# Patient Record
Sex: FEMALE | Race: BLACK OR AFRICAN AMERICAN | NOT HISPANIC OR LATINO
[De-identification: names, ages, dates, MRNs, and addresses within clinical notes are randomized per-mention and may not be internally consistent; named-entity substitution may affect disease eponyms.]

---

## 2017-11-22 ENCOUNTER — APPOINTMENT (OUTPATIENT)
Dept: PULMONOLOGY | Facility: CLINIC | Age: 37
End: 2017-11-22
Payer: COMMERCIAL

## 2017-11-22 VITALS
BODY MASS INDEX: 45.99 KG/M2 | TEMPERATURE: 98 F | RESPIRATION RATE: 16 BRPM | SYSTOLIC BLOOD PRESSURE: 130 MMHG | HEART RATE: 83 BPM | OXYGEN SATURATION: 98 % | WEIGHT: 293 LBS | HEIGHT: 67 IN | DIASTOLIC BLOOD PRESSURE: 82 MMHG

## 2017-11-22 DIAGNOSIS — Z86.39 PERSONAL HISTORY OF OTHER ENDOCRINE, NUTRITIONAL AND METABOLIC DISEASE: ICD-10-CM

## 2017-11-22 DIAGNOSIS — Z86.79 PERSONAL HISTORY OF OTHER DISEASES OF THE CIRCULATORY SYSTEM: ICD-10-CM

## 2017-11-22 PROCEDURE — 94060 EVALUATION OF WHEEZING: CPT

## 2017-11-22 PROCEDURE — 94729 DIFFUSING CAPACITY: CPT

## 2017-11-22 PROCEDURE — 99204 OFFICE O/P NEW MOD 45 MIN: CPT | Mod: 25

## 2017-11-22 PROCEDURE — 94727 GAS DIL/WSHOT DETER LNG VOL: CPT

## 2017-11-22 RX ORDER — METFORMIN HYDROCHLORIDE 500 MG/1
500 TABLET, COATED ORAL
Refills: 0 | Status: ACTIVE | COMMUNITY

## 2017-11-22 RX ORDER — AMLODIPINE BESYLATE 5 MG/1
TABLET ORAL
Refills: 0 | Status: ACTIVE | COMMUNITY

## 2018-01-24 ENCOUNTER — APPOINTMENT (OUTPATIENT)
Dept: PULMONOLOGY | Facility: CLINIC | Age: 38
End: 2018-01-24
Payer: COMMERCIAL

## 2018-01-24 VITALS
TEMPERATURE: 98.7 F | BODY MASS INDEX: 45.99 KG/M2 | OXYGEN SATURATION: 100 % | WEIGHT: 293 LBS | HEIGHT: 67 IN | HEART RATE: 76 BPM | SYSTOLIC BLOOD PRESSURE: 124 MMHG | RESPIRATION RATE: 16 BRPM | DIASTOLIC BLOOD PRESSURE: 84 MMHG

## 2018-01-24 DIAGNOSIS — G47.33 OBSTRUCTIVE SLEEP APNEA (ADULT) (PEDIATRIC): ICD-10-CM

## 2018-01-24 PROCEDURE — 99214 OFFICE O/P EST MOD 30 MIN: CPT

## 2018-03-21 ENCOUNTER — APPOINTMENT (OUTPATIENT)
Dept: PULMONOLOGY | Facility: CLINIC | Age: 38
End: 2018-03-21
Payer: COMMERCIAL

## 2018-03-21 VITALS
HEIGHT: 67 IN | OXYGEN SATURATION: 97 % | HEART RATE: 75 BPM | WEIGHT: 293 LBS | DIASTOLIC BLOOD PRESSURE: 94 MMHG | SYSTOLIC BLOOD PRESSURE: 148 MMHG | TEMPERATURE: 98.6 F | RESPIRATION RATE: 16 BRPM | BODY MASS INDEX: 45.99 KG/M2

## 2018-03-21 PROCEDURE — 94060 EVALUATION OF WHEEZING: CPT

## 2018-03-21 PROCEDURE — 99215 OFFICE O/P EST HI 40 MIN: CPT | Mod: 25

## 2018-03-21 PROCEDURE — 94727 GAS DIL/WSHOT DETER LNG VOL: CPT

## 2018-03-21 PROCEDURE — 94729 DIFFUSING CAPACITY: CPT

## 2018-03-21 RX ORDER — LEVOTHYROXINE SODIUM 0.17 MG/1
TABLET ORAL
Refills: 0 | Status: ACTIVE | COMMUNITY

## 2020-04-18 ENCOUNTER — TRANSCRIPTION ENCOUNTER (OUTPATIENT)
Age: 40
End: 2020-04-18

## 2021-01-12 ENCOUNTER — TRANSCRIPTION ENCOUNTER (OUTPATIENT)
Age: 41
End: 2021-01-12

## 2021-01-20 ENCOUNTER — APPOINTMENT (OUTPATIENT)
Dept: PULMONOLOGY | Facility: CLINIC | Age: 41
End: 2021-01-20
Payer: COMMERCIAL

## 2021-01-20 VITALS
SYSTOLIC BLOOD PRESSURE: 150 MMHG | WEIGHT: 293 LBS | DIASTOLIC BLOOD PRESSURE: 94 MMHG | HEART RATE: 74 BPM | BODY MASS INDEX: 47.93 KG/M2 | TEMPERATURE: 98.2 F | OXYGEN SATURATION: 99 %

## 2021-01-20 DIAGNOSIS — J45.909 UNSPECIFIED ASTHMA, UNCOMPLICATED: ICD-10-CM

## 2021-01-20 DIAGNOSIS — R06.02 SHORTNESS OF BREATH: ICD-10-CM

## 2021-01-20 PROCEDURE — 94060 EVALUATION OF WHEEZING: CPT

## 2021-01-20 PROCEDURE — 99214 OFFICE O/P EST MOD 30 MIN: CPT | Mod: 25

## 2021-01-20 PROCEDURE — 94729 DIFFUSING CAPACITY: CPT

## 2021-01-20 PROCEDURE — 99072 ADDL SUPL MATRL&STAF TM PHE: CPT

## 2021-01-20 PROCEDURE — 94727 GAS DIL/WSHOT DETER LNG VOL: CPT

## 2021-01-20 NOTE — REASON FOR VISIT
[Follow-Up - From Urgent Care] : an urgent care follow-up [Shortness of Breath] : shortness of Breath

## 2021-01-20 NOTE — PROCEDURE
[FreeTextEntry1] : PSG 1/5/18: Severe obstructive sleep apnea, with an AHI 89. CPAP titration indicated a therapeutic level of 7 cm of water. \par \par PFT 3/21/18: Spirometry was normal. Lung volumes were mildly reduced. DLCO was mildly reduced. No significant improvement after bronchodilator. \par \par PFT 1/20/21: No obstruction. Mild restriction. Mild reduction in diffusion. Mild improvement after bronchodilator. \par \par Chest x-ray 1/7/21: No infiltrates or effusion.

## 2021-01-20 NOTE — DISCUSSION/SUMMARY
[FreeTextEntry1] : She is a 40 year-old woman with a history of hypertension, hypothyroidism, diabetes, elevated BMI and asthma since childhood. She also has a history of severe MEHRAN. Apparently stopped CPAP. \par \par Presented with FUCHS. Spirometry was normal. Advised to use albuterol as needed. Should see Cardiology. Weight loss and exercise were advised if allowed by Cardiology. \par \par Should consider resuming CPAP. \par \par Follow up depending on Cardiology evaluation.

## 2021-01-20 NOTE — HISTORY OF PRESENT ILLNESS
[TextBox_4] : 39 y/o woman seen in ER for SOB. Was treated for asthma. Given albuterol, Symbicort and a Medrol pack. \par \par Feels better but c/o FUCHS. 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 Has to wear an N95 mask at work which makes her SOB. \par \par She had COVID -19 in April of 2020. was not hospitalized. \par \par Had bariatric surgery, lost 68 pounds then gained most of it back.

## 2021-01-20 NOTE — PHYSICAL EXAM
[General Appearance - In No Acute Distress] : no acute distress [IV] : IV [Neck Appearance] : the appearance of the neck was normal [Neck Cervical Mass (___cm)] : no neck mass was observed [Heart Sounds] : normal S1 and S2 [Auscultation Breath Sounds / Voice Sounds] : lungs were clear to auscultation bilaterally [Abnormal Walk] : normal gait [Nail Clubbing] : no clubbing of the fingernails [No Focal Deficits] : no focal deficits [Oriented To Time, Place, And Person] : oriented to person, place, and time [] : no hepato-splenomegaly [Cyanosis, Localized] : no localized cyanosis [FreeTextEntry1] : Obese abdomen

## 2021-01-20 NOTE — REVIEW OF SYSTEMS
[Fatigue] : fatigue [Hypertension] : ~T hypertension [Back Pain] : ~T back pain [Diabetes] : diabetes mellitus [Thyroid Problem] : thyroid problem [Snoring] : snoring [Nonrestorative Sleep] : nonrestorative sleep [Dyspnea] : dyspnea [Wheezing] : wheezing [Fever] : no fever [Nasal Congestion] : no nasal congestion [Epistaxis] : no nosebleeds [Postnasal Drip] : no postnasal drip [Cough] : no cough [Chest Tightness] : no chest tightness [PND] : no PND [Palpitations] : no palpitations [Edema] : ~T edema was not present [Nasal Discharge] : no nasal discharge [Heartburn] : no heartburn [Reflux] : no reflux [Dysuria] : no dysuria [Rash] : no [unfilled] rash [Anemia] : no anemia [Headache] : no headache [Depression] : no depression [Anxiety] : no anxiety [DVT] : no DVT [Pulmonary Embolism] : no pulmonary embolism [Difficulty Initiating Sleep] : no difficulty falling asleep [Difficulty Maintaining Sleep] : no difficulty maintaining sleep [Witnessed Apneas] : demonstrated no ~M apnea [Hypersomnolence] : not sleeping much more than usual